# Patient Record
Sex: FEMALE | Race: WHITE | NOT HISPANIC OR LATINO | URBAN - METROPOLITAN AREA
[De-identification: names, ages, dates, MRNs, and addresses within clinical notes are randomized per-mention and may not be internally consistent; named-entity substitution may affect disease eponyms.]

---

## 2020-07-25 ENCOUNTER — TELEPHONE ENCOUNTER (OUTPATIENT)
Dept: URBAN - METROPOLITAN AREA CLINIC 13 | Facility: CLINIC | Age: 35
End: 2020-07-25

## 2020-07-25 RX ORDER — RIFAXIMIN 550 MG/1
TAKE 1 TABLET 3 TIMES DAILY TABLET ORAL
Qty: 42 | Refills: 2 | OUTPATIENT
Start: 2016-02-19 | End: 2016-03-18

## 2020-07-25 RX ORDER — PREDNISONE 10 MG/1
TAKE 1 TABLET DAILY TABLET ORAL
Refills: 0 | OUTPATIENT
End: 2015-12-21

## 2020-07-25 RX ORDER — HYDROCODONE BITARTRATE AND ACETAMINOPHEN 5; 325 MG/1; MG/1
TAKE ONE TABLET BY MOUTH THREE TIMES DAILY AS NEEDED FOR PAIN TABLET ORAL
Qty: 45 | Refills: 0 | OUTPATIENT
Start: 2013-08-26 | End: 2014-01-17

## 2020-07-25 RX ORDER — PREDNISONE 10 MG/1
TABLET ORAL
Qty: 48 | Refills: 0 | OUTPATIENT
Start: 2018-11-14 | End: 2019-08-20

## 2020-07-25 RX ORDER — LIDOCAINE HYDROCHLORIDE 20 MG/ML
SOLUTION ORAL; TOPICAL
Qty: 100 | Refills: 0 | OUTPATIENT
Start: 2019-03-02 | End: 2019-08-20

## 2020-07-25 RX ORDER — DOXYCYCLINE 100 MG/1
TABLET, FILM COATED ORAL
Qty: 14 | Refills: 0 | OUTPATIENT
Start: 2019-08-12 | End: 2019-08-20

## 2020-07-25 RX ORDER — CHLORDIAZEPOXIDE HYDROCHLORIDE AND CLIDINIUM BROMIDE 5; 2.5 MG/1; MG/1
TAKE 1 CAPSULE EVERY 6 HOURS PRN ABDOMINAL PAIN CAPSULE ORAL
Qty: 45 | Refills: 3 | OUTPATIENT
Start: 2016-02-19 | End: 2017-04-14

## 2020-07-25 RX ORDER — AZELASTINE HYDROCHLORIDE 137 UG/1
SPRAY, METERED NASAL
Qty: 30 | Refills: 0 | OUTPATIENT
Start: 2018-12-07 | End: 2019-08-20

## 2020-07-25 RX ORDER — DESOGESTREL/ETHINYL ESTRADIOL AND ETHINYL ESTRADIOL 21-5 (28)
KIT ORAL
Qty: 28 | Refills: 0 | OUTPATIENT
Start: 2013-08-13 | End: 2014-01-17

## 2020-07-25 RX ORDER — LOFEXIDINE HYDROCHLORIDE 0.2 MG/1
TABLET, FILM COATED ORAL
Qty: 108 | Refills: 0 | OUTPATIENT
Start: 2019-02-27 | End: 2019-08-20

## 2020-07-25 RX ORDER — INFLIXIMAB 100 MG/10ML
USE AS DIRECTED INJECTION, POWDER, LYOPHILIZED, FOR SOLUTION INTRAVENOUS
Refills: 0 | OUTPATIENT
End: 2014-01-17

## 2020-07-25 RX ORDER — PREDNISONE 10 MG/1
TAKE 2 TABLETS DAILY TABLET ORAL
Qty: 60 | Refills: 4 | OUTPATIENT
Start: 2013-03-01 | End: 2013-07-02

## 2020-07-25 RX ORDER — MESALAMINE 500 MG/1
TAKE 2 CAPSULES 4 TIMES DAILY CAPSULE ORAL
Qty: 240 | Refills: 11 | OUTPATIENT
Start: 2014-06-02 | End: 2014-11-07

## 2020-07-25 RX ORDER — MESALAMINE 1000 MG/1
INSERT 1 SUPP BEDTIME SUPPOSITORY RECTAL
Qty: 30 | Refills: 0 | OUTPATIENT
Start: 2014-01-28 | End: 2015-06-02

## 2020-07-25 RX ORDER — BUDESONIDE 3 MG/1
TAKE 2 CAPSULE DAILY CAPSULE, COATED PELLETS ORAL
Qty: 60 | Refills: 6 | OUTPATIENT
Start: 2017-04-14 | End: 2018-11-27

## 2020-07-25 RX ORDER — METRONIDAZOLE 500 MG/1
TAKE 1 TABLET 3 TIMES DAILY TABLET, FILM COATED ORAL
Qty: 30 | Refills: 0 | OUTPATIENT
End: 2013-04-19

## 2020-07-25 RX ORDER — ETONOGESTREL 68 MG/1
USE AS DIRECTED IMPLANT SUBCUTANEOUS
Refills: 0 | OUTPATIENT
End: 2017-07-18

## 2020-07-25 RX ORDER — PREDNISONE 10 MG/1
TAKE 1 TABLET DAILY TABLET ORAL
Qty: 30 | Refills: 3 | OUTPATIENT
Start: 2015-07-01 | End: 2015-08-28

## 2020-07-25 RX ORDER — COLESEVELAM HYDROCHLORIDE 625 MG/1
TAKE 1 TABLET TWICE DAILY AS NEEDED FOR DIARRHEA TABLET, FILM COATED ORAL
Qty: 60 | Refills: 2 | OUTPATIENT
Start: 2018-06-04 | End: 2018-06-12

## 2020-07-25 RX ORDER — LEVONORGESTREL AND ETHINYL ESTRADIOL 0.15-0.03
KIT ORAL
Qty: 91 | Refills: 0 | OUTPATIENT
Start: 2018-10-29 | End: 2019-08-20

## 2020-07-25 RX ORDER — PANTOPRAZOLE SODIUM 40 MG/1
7/29**TAKE 1 TABLET BY MOUTH EVERY DAY TABLET, DELAYED RELEASE ORAL
Qty: 30 | Refills: 8 | OUTPATIENT
Start: 2015-12-21 | End: 2019-10-21

## 2020-07-25 RX ORDER — HYDROCODONE BITARTRATE AND ACETAMINOPHEN 5; 325 MG/1; MG/1
TAKE 1 TABLET 3 TIMES DAILY PRN PAIN TABLET ORAL
Qty: 30 | Refills: 0 | OUTPATIENT
Start: 2014-08-22 | End: 2015-08-28

## 2020-07-25 RX ORDER — MERCAPTOPURINE 50 MG/1
1 TABLET PO QD TABLET ORAL
Qty: 30 | Refills: 6 | OUTPATIENT
Start: 2017-08-25 | End: 2017-09-19

## 2020-07-25 RX ORDER — PREDNISONE 20 MG/1
TAKE 1 TABLET DAILY TABLET ORAL
Qty: 30 | Refills: 0 | OUTPATIENT
End: 2016-02-19

## 2020-07-25 RX ORDER — NITROFURANTOIN MONOHYDRATE/MACROCRYSTALLINE 25; 75 MG/1; MG/1
TAKE 1 CAPSULE TWICE DAILY CAPSULE ORAL
Refills: 0 | OUTPATIENT
End: 2015-12-21

## 2020-07-25 RX ORDER — HYDROCODONE BITARTRATE AND ACETAMINOPHEN 5; 325 MG/1; MG/1
TAKE 1 TABLET 3 TIMES DAILY PRN PAIN TABLET ORAL
Qty: 42 | Refills: 0 | OUTPATIENT
Start: 2013-04-19 | End: 2013-08-26

## 2020-07-25 RX ORDER — MERCAPTOPURINE 50 MG/1
TAKE 2 TABLETS BY MOUTH EVERY DAY TABLET ORAL
Qty: 180 | Refills: 4 | OUTPATIENT
Start: 2019-08-26 | End: 2019-10-09

## 2020-07-25 RX ORDER — FLUCONAZOLE 150 MG/1
TABLET ORAL
Qty: 1 | Refills: 0 | OUTPATIENT
Start: 2018-11-14 | End: 2019-08-20

## 2020-07-25 RX ORDER — PROMETHAZINE HYDROCHLORIDE 25 MG/1
INSERT 1 SUPPOSITORY RECTALLY EVERY 8 HOURS AS NEEDED SUPPOSITORY RECTAL
Qty: 45 | Refills: 2 | OUTPATIENT
Start: 2016-02-19 | End: 2017-11-20

## 2020-07-25 RX ORDER — AZITHROMYCIN DIHYDRATE 250 MG/1
TABLET, FILM COATED ORAL
Qty: 6 | Refills: 0 | OUTPATIENT
Start: 2018-10-07 | End: 2019-08-20

## 2020-07-25 RX ORDER — OMEPRAZOLE 20 MG/1
TAKE 1 CAPSULE TWICE DAILY CAPSULE, DELAYED RELEASE ORAL
Qty: 28 | Refills: 1 | OUTPATIENT
Start: 2013-10-29 | End: 2014-01-17

## 2020-07-25 RX ORDER — LIDOCAINE AND PRILOCAINE 25; 25 MG/G; MG/G
APPLY 1 HOUR BEFORE PROCEDURE AS DIRECTED CREAM TOPICAL
Qty: 1 | Refills: 0 | OUTPATIENT
Start: 2013-04-12 | End: 2013-09-23

## 2020-07-25 RX ORDER — MISOPROSTOL 200 UG/1
TABLET ORAL
Qty: 4 | Refills: 0 | OUTPATIENT
Start: 2018-08-27 | End: 2019-08-20

## 2020-07-25 RX ORDER — INFLIXIMAB 100 MG/10ML
USE AS DIRECTED INJECTION, POWDER, LYOPHILIZED, FOR SOLUTION INTRAVENOUS
Qty: 30 | Refills: 0 | OUTPATIENT
Start: 2015-01-06 | End: 2017-11-20

## 2020-07-25 RX ORDER — POLYETHYLENE GLYCOL 3350, SODIUM CHLORIDE, SODIUM BICARBONATE AND POTASSIUM CHLORIDE WITH LEMON FLAVOR 420; 11.2; 5.72; 1.48 G/4L; G/4L; G/4L; G/4L
TAKE AS DIRECTED POWDER, FOR SOLUTION ORAL
Qty: 1 | Refills: 0 | OUTPATIENT
Start: 2015-12-21 | End: 2016-01-25

## 2020-07-25 RX ORDER — BISMUTH SUBCITRATE POTASSIUM, METRONIDAZOLE, TETRACYCLINE HYDROCHLORIDE 140; 125; 125 MG/1; MG/1; MG/1
TAKE 3 CAPSULE EVERY 6 HOURS CAPSULE ORAL
Qty: 120 | Refills: 0 | OUTPATIENT
Start: 2013-10-29 | End: 2014-01-17

## 2020-07-25 RX ORDER — COLESEVELAM HYDROCHLORIDE 625 MG/1
TAKE 3 TABLET TWICE DAILY TABLET, FILM COATED ORAL
Qty: 180 | Refills: 11 | OUTPATIENT
Start: 2014-05-23 | End: 2014-11-07

## 2020-07-25 RX ORDER — POLYETHYLENE GLYCOL 3350, SODIUM SULFATE, SODIUM CHLORIDE, POTASSIUM CHLORIDE, ASCORBIC ACID, SODIUM ASCORBATE 7.5-2.691G
TAKE 32 OZ AS DIRECTED 5:00PM THE EVENING BEFORE AND 6HR PRIOR TO PROCEDURE KIT ORAL
Qty: 1 | Refills: 0 | OUTPATIENT
Start: 2014-08-19 | End: 2014-08-22

## 2020-07-25 RX ORDER — HYDROCODONE BITARTRATE AND ACETAMINOPHEN 5; 325 MG/1; MG/1
TAKE 1 TABLET 3 TIMES DAILY PRN PAIN TABLET ORAL
Qty: 40 | Refills: 0 | OUTPATIENT
Start: 2015-09-17 | End: 2018-11-27

## 2020-07-25 RX ORDER — AZATHIOPRINE 50 1/1
TAKE 1.5 TABLET DAILY TABLET ORAL
Qty: 45 | Refills: 11 | OUTPATIENT
Start: 2013-03-01 | End: 2014-01-17

## 2020-07-25 RX ORDER — CIPROFLOXACIN HYDROCHLORIDE 500 MG/1
TAKE 1 TABLET TWICE DAILY TABLET, FILM COATED ORAL
Qty: 20 | Refills: 0 | OUTPATIENT
End: 2013-10-08

## 2020-07-25 RX ORDER — CEPHALEXIN 500 MG/1
CAPSULE ORAL
Qty: 30 | Refills: 0 | OUTPATIENT
Start: 2018-10-18 | End: 2019-08-20

## 2020-07-25 RX ORDER — DIPHENOXYLATE HYDROCHLORIDE AND ATROPINE SULFATE 2.5; .025 MG/1; MG/1
TAKE 1-2 TABLETS BID PRN DAIRRHEA TABLET ORAL
Qty: 50 | Refills: 1 | OUTPATIENT
Start: 2017-05-25 | End: 2017-09-19

## 2020-07-25 RX ORDER — MERCAPTOPURINE 50 MG/1
TAKE 2 TABLETS BY MOUTH EVERY DAY TABLET ORAL
Qty: 60 | Refills: 11 | OUTPATIENT
Start: 2017-08-25 | End: 2019-08-26

## 2020-07-26 ENCOUNTER — TELEPHONE ENCOUNTER (OUTPATIENT)
Dept: URBAN - METROPOLITAN AREA CLINIC 13 | Facility: CLINIC | Age: 35
End: 2020-07-26

## 2020-07-26 RX ORDER — METRONIDAZOLE 500 MG/1
TABLET ORAL
Qty: 28 | Refills: 0 | Status: ACTIVE | COMMUNITY
Start: 2013-02-13

## 2020-07-26 RX ORDER — CEPHALEXIN 500 MG/1
CAPSULE ORAL
Qty: 30 | Refills: 0 | Status: ACTIVE | COMMUNITY
Start: 2018-10-18

## 2020-07-26 RX ORDER — CODEINE PHOSPHATE AND GUAIFENESIN 10; 100 MG/5ML; MG/5ML
SOLUTION ORAL
Qty: 120 | Refills: 0 | Status: ACTIVE | COMMUNITY
Start: 2019-03-16

## 2020-07-26 RX ORDER — MERCAPTOPURINE 50 MG/1
TAKE 2 TABLETS BY MOUTH EVERY DAY TABLET ORAL
Qty: 180 | Refills: 5 | Status: ACTIVE | COMMUNITY
Start: 2019-10-09

## 2020-07-26 RX ORDER — ACETAMINOPHEN AND CODEINE PHOSPHATE 300; 30 MG/1; MG/1
TABLET ORAL
Qty: 30 | Refills: 0 | Status: ACTIVE | COMMUNITY
Start: 2015-06-26

## 2020-07-26 RX ORDER — OXYCODONE AND ACETAMINOPHEN 5; 325 MG/1; MG/1
TABLET ORAL
Qty: 30 | Refills: 0 | Status: ACTIVE | COMMUNITY
Start: 2013-02-13

## 2020-07-26 RX ORDER — PREDNISONE 10 MG/1
TABLET ORAL
Qty: 48 | Refills: 0 | Status: ACTIVE | COMMUNITY
Start: 2018-11-14

## 2020-07-26 RX ORDER — AZITHROMYCIN DIHYDRATE 250 MG/1
TABLET, FILM COATED ORAL
Qty: 6 | Refills: 0 | Status: ACTIVE | COMMUNITY
Start: 2018-10-07

## 2020-07-26 RX ORDER — AZITHROMYCIN DIHYDRATE 250 MG/1
TAKE 2 TABLETS BY MOUTH ON DAY ONE AND 1 TABLET BY MOUTH ON DAYS 2-5 TABLET, FILM COATED ORAL
Qty: 6 | Refills: 0 | Status: ACTIVE | COMMUNITY
Start: 2018-03-10

## 2020-07-26 RX ORDER — VITAM B12 100 MCG
TAKE 1 TABLET DAILY AS DIRECTED TAB ORAL
Refills: 0 | Status: ACTIVE | COMMUNITY

## 2020-07-26 RX ORDER — LIDOCAINE HYDROCHLORIDE 20 MG/ML
SOLUTION ORAL; TOPICAL
Qty: 100 | Refills: 0 | Status: ACTIVE | COMMUNITY
Start: 2019-03-02

## 2020-07-26 RX ORDER — TRIAMCINOLONE ACETONIDE 1 MG/G
APPLY AND GENTLY MASSAGE INTO AFFECTED AREA(S) TWICE DAILY OINTMENT TOPICAL
Refills: 0 | Status: ACTIVE | COMMUNITY
Start: 2019-05-13

## 2020-07-26 RX ORDER — PANTOPRAZOLE SODIUM 40 MG/1
TABLET, DELAYED RELEASE ORAL
Qty: 60 | Refills: 0 | Status: ACTIVE | COMMUNITY
Start: 2015-06-09

## 2020-07-26 RX ORDER — HYDROCODONE BITARTRATE AND ACETAMINOPHEN 10; 325 MG/1; MG/1
TABLET ORAL
Qty: 15 | Refills: 0 | Status: ACTIVE | COMMUNITY
Start: 2019-06-18

## 2020-07-26 RX ORDER — DIPHENOXYLATE HYDROCHLORIDE AND ATROPINE SULFATE 2.5; .025 MG/1; MG/1
TABLET ORAL
Qty: 30 | Refills: 0 | Status: ACTIVE | COMMUNITY
Start: 2013-12-20

## 2020-07-26 RX ORDER — CITALOPRAM 40 MG/1
TABLET ORAL
Qty: 30 | Refills: 0 | Status: ACTIVE | COMMUNITY
Start: 2019-03-03

## 2020-07-26 RX ORDER — OSELTAMIVIR PHOSPHATE 75 MG/1
TAKE 1 CAPSULE (ORAL) 2 TIMES PER DAY FOR 5 DAYS CAPSULE ORAL
Qty: 10 | Refills: 0 | Status: ACTIVE | COMMUNITY
Start: 2018-01-27

## 2020-07-26 RX ORDER — CLONIDINE HYDROCHLORIDE 0.1 MG/1
TAKE 1 TABLET AT BEDTIME TABLET ORAL
Refills: 0 | Status: ACTIVE | COMMUNITY
Start: 2019-05-22

## 2020-07-26 RX ORDER — DOXYCYCLINE HYCLATE 100 MG/1
TABLET ORAL
Qty: 20 | Refills: 0 | Status: ACTIVE | COMMUNITY
Start: 2019-03-16

## 2020-07-26 RX ORDER — ACETAMINOPHEN AND CODEINE PHOSPHATE 300; 30 MG/1; MG/1
TABLET ORAL
Qty: 18 | Refills: 0 | Status: ACTIVE | COMMUNITY
Start: 2013-05-20

## 2020-07-26 RX ORDER — MISOPROSTOL 200 UG/1
TABLET ORAL
Qty: 4 | Refills: 0 | Status: ACTIVE | COMMUNITY
Start: 2018-08-27

## 2020-07-26 RX ORDER — CODEINE POLISTIREX AND CHLORPHENIRAMINE POLISTIREX 14.7; 2.8 MG/5ML; MG/5ML
TAKE 2 TEASPOONS EVERY 12 HOURS FOR 7 DAYS SUSPENSION, EXTENDED RELEASE ORAL
Qty: 140 | Refills: 0 | Status: ACTIVE | COMMUNITY
Start: 2018-03-10

## 2020-07-26 RX ORDER — PHENTERMINE HYDROCHLORIDE 37.5 MG/1
TAKE 1 CAPSULE BY MOUTH EVERY DAY IN THE MORNING CAPSULE ORAL
Qty: 30 | Refills: 0 | Status: ACTIVE | COMMUNITY
Start: 2019-01-22

## 2020-07-26 RX ORDER — AZELASTINE HYDROCHLORIDE 137 UG/1
SPRAY, METERED NASAL
Qty: 30 | Refills: 0 | Status: ACTIVE | COMMUNITY
Start: 2018-12-07

## 2020-07-26 RX ORDER — HYOSCYAMINE SULFATE 0.12 MG/1
PLACE 1-2 TABLET EVERY 6 HOURS PRN ABD PAIN TABLET, ORALLY DISINTEGRATING ORAL
Qty: 45 | Refills: 4 | Status: ACTIVE | COMMUNITY
Start: 2018-11-27

## 2020-07-26 RX ORDER — LOFEXIDINE HYDROCHLORIDE 0.2 MG/1
TABLET, FILM COATED ORAL
Qty: 108 | Refills: 0 | Status: ACTIVE | COMMUNITY
Start: 2019-02-27

## 2020-07-26 RX ORDER — DIPHENOXYLATE HYDROCHLORIDE AND ATROPINE SULFATE 2.5; .025 MG/1; MG/1
TAKE ONE TO TWO TABLETS BY MOUTH EVERY 6 HOURS AS NEEDED FOR DIARRHEA TABLET ORAL
Qty: 45 | Refills: 0 | Status: ACTIVE | COMMUNITY
Start: 2017-08-25

## 2020-07-26 RX ORDER — AMOXICILLIN AND CLAVULANATE POTASSIUM 875; 125 MG/1; MG/1
TABLET, FILM COATED ORAL
Qty: 20 | Refills: 0 | Status: ACTIVE | COMMUNITY
Start: 2018-06-20

## 2020-07-26 RX ORDER — FLUCONAZOLE 150 MG/1
TABLET ORAL
Qty: 1 | Refills: 0 | Status: ACTIVE | COMMUNITY
Start: 2013-08-13

## 2020-07-26 RX ORDER — ACETAMINOPHEN 325 MG
TAKE 1 CAPSULE DAILY TABLET ORAL
Refills: 0 | Status: ACTIVE | COMMUNITY

## 2020-07-26 RX ORDER — COLESTIPOL HYDROCHLORIDE 1 G/1
TAKE 2 TABLETS TWICE DAILY TABLET ORAL
Qty: 120 | Refills: 11 | Status: ACTIVE | COMMUNITY
Start: 2018-06-12

## 2020-07-26 RX ORDER — CITALOPRAM 20 MG/1
TAKE ONE TABLET BY MOUTH DAILY TABLET ORAL
Qty: 30 | Refills: 6 | Status: ACTIVE | COMMUNITY
Start: 2014-08-25

## 2020-07-26 RX ORDER — LEVONORGESTREL AND ETHINYL ESTRADIOL 0.15-0.03
KIT ORAL
Qty: 91 | Refills: 0 | Status: ACTIVE | COMMUNITY
Start: 2018-10-29

## 2020-07-26 RX ORDER — NYSTATIN AND TRIAMCINOLONE ACETONIDE 100000; 1 MG/G; MG/G
CREAM TOPICAL
Qty: 30 | Refills: 0 | Status: ACTIVE | COMMUNITY
Start: 2013-08-13

## 2020-07-26 RX ORDER — LEVONORGESTREL AND ETHINYL ESTRADIOL 0.15-0.03
TAKE 1 TABLET BY MOUTH EVERY DAY SKIP PLACEBO PILLS KIT ORAL
Qty: 91 | Refills: 0 | Status: ACTIVE | COMMUNITY
Start: 2017-08-17

## 2020-07-26 RX ORDER — HYDROCODONE BITARTRATE AND ACETAMINOPHEN 10; 325 MG/1; MG/1
TABLET ORAL
Qty: 20 | Refills: 0 | Status: ACTIVE | COMMUNITY
Start: 2015-02-27

## 2020-07-26 RX ORDER — PREDNISONE 10 MG/1
TABLET ORAL
Qty: 54 | Refills: 0 | Status: ACTIVE | COMMUNITY
Start: 2013-02-13

## 2020-07-26 RX ORDER — TRAMADOL HYDROCHLORIDE 50 MG/1
TAKE 1 TABLET BY MOUTH EVERY 6 HOURS AS NEEDED FOR PAIN TABLET ORAL
Qty: 45 | Refills: 0 | Status: ACTIVE | COMMUNITY
Start: 2016-01-25

## 2020-07-26 RX ORDER — CODEINE PHOSPHATE AND GUAIFENESIN 10; 100 MG/5ML; MG/5ML
TAKE 5 TO 10ML BY MOUTH 4 TIMES A DAY AS NEEDED COUGH SOLUTION ORAL
Qty: 120 | Refills: 0 | Status: ACTIVE | COMMUNITY
Start: 2018-01-27

## 2020-07-26 RX ORDER — FLUCONAZOLE 150 MG/1
TAKE 1 TABLET BY MOUTH EVERY DAY TABLET ORAL
Qty: 2 | Refills: 0 | Status: ACTIVE | COMMUNITY
Start: 2018-06-26

## 2020-07-26 RX ORDER — ACETAMINOPHEN AND CODEINE PHOSPHATE 300; 30 MG/1; MG/1
TABLET ORAL
Qty: 20 | Refills: 0 | Status: ACTIVE | COMMUNITY
Start: 2019-03-20

## 2020-07-26 RX ORDER — PROMETHAZINE HYDROCHLORIDE 12.5 MG/1
TABLET ORAL
Qty: 30 | Refills: 0 | Status: ACTIVE | COMMUNITY
Start: 2015-06-09

## 2020-07-26 RX ORDER — FLUCONAZOLE 150 MG/1
TABLET ORAL
Qty: 1 | Refills: 0 | Status: ACTIVE | COMMUNITY
Start: 2018-11-14

## 2020-07-26 RX ORDER — BENZONATATE 200 MG/1
TAKE 1 CAPSULE BY MOUTH THREE TIMES A DAY CAPSULE ORAL
Qty: 30 | Refills: 0 | Status: ACTIVE | COMMUNITY
Start: 2018-06-20

## 2020-07-26 RX ORDER — HYDROCODONE BITARTRATE AND ACETAMINOPHEN 10; 325 MG/1; MG/1
TABLET ORAL
Qty: 30 | Refills: 0 | Status: ACTIVE | COMMUNITY
Start: 2015-06-09

## 2020-07-26 RX ORDER — HYDROCODONE BITARTRATE AND ACETAMINOPHEN 10; 325 MG/1; MG/1
TABLET ORAL
Qty: 60 | Refills: 0 | Status: ACTIVE | COMMUNITY
Start: 2013-12-10

## 2020-07-26 RX ORDER — PREDNISOLONE ACETATE 10 MG/ML
INSTILL 1 DROP IN THE LEFT EYE EVERY HOUR WHILE AWAKE SUSPENSION/ DROPS OPHTHALMIC
Refills: 0 | Status: ACTIVE | COMMUNITY
Start: 2019-03-12

## 2020-07-26 RX ORDER — PANTOPRAZOLE SODIUM 40 MG/1
7/29**TAKE 1 TABLET BY MOUTH EVERY DAY TABLET, DELAYED RELEASE ORAL
Qty: 90 | Refills: 3 | Status: ACTIVE | COMMUNITY
Start: 2019-10-21

## 2020-07-26 RX ORDER — AMOXICILLIN AND CLAVULANATE POTASSIUM 875; 125 MG/1; MG/1
TAKE 1 TABLET (ORAL) EVERY 12 HOURS FOR 7 DAYS TABLET, FILM COATED ORAL
Qty: 14 | Refills: 0 | Status: ACTIVE | COMMUNITY
Start: 2018-03-10

## 2020-07-26 RX ORDER — FLUCONAZOLE 150 MG/1
TAKE 1 TABLET (150 MG TOTAL) BY MOUTH AS A SINGLE DOSE TABLET ORAL
Qty: 1 | Refills: 0 | Status: ACTIVE | COMMUNITY
Start: 2018-03-18

## 2020-12-21 ENCOUNTER — ERX REFILL RESPONSE (OUTPATIENT)
Age: 35
End: 2020-12-21

## 2020-12-21 RX ORDER — PANTOPRAZOLE SODIUM 40 MG/1
7/29**TAKE 1 TABLET BY MOUTH EVERY DAY TABLET, DELAYED RELEASE ORAL
Qty: 90 | Refills: 3

## 2020-12-22 ENCOUNTER — ERX REFILL RESPONSE (OUTPATIENT)
Age: 35
End: 2020-12-22

## 2020-12-22 RX ORDER — MERCAPTOPURINE 50 MG/1
TAKE 2 TABLETS BY MOUTH EVERY DAY TABLET ORAL
Qty: 180 | Refills: 4

## 2021-02-17 ENCOUNTER — ERX REFILL RESPONSE (OUTPATIENT)
Age: 36
End: 2021-02-17

## 2021-02-17 RX ORDER — PROMETHAZINE HYDROCHLORIDE 12.5 MG/1
TAKE 1 TO 2 TABLETS BY MOUTH EVERY 6 HOURS AS NEEDED TABLET ORAL
Qty: 40 | Refills: 3

## 2021-12-07 ENCOUNTER — WEB ENCOUNTER (OUTPATIENT)
Dept: URBAN - METROPOLITAN AREA CLINIC 113 | Facility: CLINIC | Age: 36
End: 2021-12-07

## 2021-12-20 ENCOUNTER — ERX REFILL RESPONSE (OUTPATIENT)
Dept: URBAN - METROPOLITAN AREA CLINIC 113 | Facility: CLINIC | Age: 36
End: 2021-12-20

## 2021-12-20 RX ORDER — PANTOPRAZOLE SODIUM 40 MG/1
7/29**TAKE 1 TABLET BY MOUTH EVERY DAY 90 TABLET, DELAYED RELEASE ORAL
Qty: 90 TABLET | Refills: 4 | OUTPATIENT

## 2021-12-20 RX ORDER — PANTOPRAZOLE SODIUM 40 MG/1
7/29**TAKE 1 TABLET BY MOUTH EVERY DAY TABLET, DELAYED RELEASE ORAL
Qty: 90 | Refills: 3 | OUTPATIENT

## 2022-01-24 ENCOUNTER — ERX REFILL RESPONSE (OUTPATIENT)
Dept: URBAN - METROPOLITAN AREA CLINIC 113 | Facility: CLINIC | Age: 37
End: 2022-01-24

## 2022-01-24 RX ORDER — MERCAPTOPURINE 50 MG/1
TAKE 2 TABLETS BY MOUTH EVERY DAY TABLET ORAL
Qty: 180 TABLET | Refills: 5 | OUTPATIENT

## 2022-01-24 RX ORDER — MERCAPTOPURINE 50 MG/1
TAKE 2 TABLETS BY MOUTH EVERY DAY TABLET ORAL
Qty: 180 | Refills: 4 | OUTPATIENT

## 2022-05-13 ENCOUNTER — DASHBOARD ENCOUNTERS (OUTPATIENT)
Age: 37
End: 2022-05-13

## 2022-05-13 ENCOUNTER — OFFICE VISIT (OUTPATIENT)
Dept: URBAN - METROPOLITAN AREA CLINIC 113 | Facility: CLINIC | Age: 37
End: 2022-05-13
Payer: COMMERCIAL

## 2022-05-13 VITALS
DIASTOLIC BLOOD PRESSURE: 98 MMHG | WEIGHT: 214 LBS | SYSTOLIC BLOOD PRESSURE: 136 MMHG | HEIGHT: 64 IN | HEART RATE: 93 BPM | BODY MASS INDEX: 36.54 KG/M2 | TEMPERATURE: 97.7 F

## 2022-05-13 DIAGNOSIS — K62.5 BRIGHT RED BLOOD PER RECTUM: ICD-10-CM

## 2022-05-13 DIAGNOSIS — R19.7 DIARRHEA: ICD-10-CM

## 2022-05-13 DIAGNOSIS — R10.84 GENERALIZED ABDOMINAL PAIN: ICD-10-CM

## 2022-05-13 DIAGNOSIS — R11.0 NAUSEA: ICD-10-CM

## 2022-05-13 DIAGNOSIS — K50.00 CROHN'S DISEASE INVOLVING TERMINAL ILEUM: ICD-10-CM

## 2022-05-13 PROCEDURE — 99214 OFFICE O/P EST MOD 30 MIN: CPT | Performed by: INTERNAL MEDICINE

## 2022-05-13 RX ORDER — HYOSCYAMINE SULFATE 0.12 MG/1
PLACE 1-2 TABLET EVERY 6 HOURS PRN ABD PAIN TABLET, ORALLY DISINTEGRATING ORAL
Qty: 45 | Refills: 4 | Status: ON HOLD | COMMUNITY
Start: 2018-11-27

## 2022-05-13 RX ORDER — TRIAMCINOLONE ACETONIDE 1 MG/G
APPLY AND GENTLY MASSAGE INTO AFFECTED AREA(S) TWICE DAILY OINTMENT TOPICAL
Refills: 0 | Status: ON HOLD | COMMUNITY
Start: 2019-05-13

## 2022-05-13 RX ORDER — CITALOPRAM 20 MG/1
TAKE ONE TABLET BY MOUTH DAILY TABLET ORAL
Qty: 30 | Refills: 6 | Status: ON HOLD | COMMUNITY
Start: 2014-08-25

## 2022-05-13 RX ORDER — ACETAMINOPHEN AND CODEINE PHOSPHATE 300; 30 MG/1; MG/1
TABLET ORAL
Qty: 18 | Refills: 0 | Status: ON HOLD | COMMUNITY
Start: 2013-05-20

## 2022-05-13 RX ORDER — PHENTERMINE HYDROCHLORIDE 37.5 MG/1
TAKE 1 CAPSULE BY MOUTH EVERY DAY IN THE MORNING CAPSULE ORAL
Qty: 30 | Refills: 0 | Status: ON HOLD | COMMUNITY
Start: 2019-01-22

## 2022-05-13 RX ORDER — OSELTAMIVIR PHOSPHATE 75 MG/1
TAKE 1 CAPSULE (ORAL) 2 TIMES PER DAY FOR 5 DAYS CAPSULE ORAL
Qty: 10 | Refills: 0 | Status: ON HOLD | COMMUNITY
Start: 2018-01-27

## 2022-05-13 RX ORDER — METRONIDAZOLE 500 MG/1
TABLET ORAL
Qty: 28 | Refills: 0 | Status: ON HOLD | COMMUNITY
Start: 2013-02-13

## 2022-05-13 RX ORDER — FLUCONAZOLE 150 MG/1
TABLET ORAL
Qty: 1 | Refills: 0 | Status: ON HOLD | COMMUNITY
Start: 2013-08-13

## 2022-05-13 RX ORDER — PANTOPRAZOLE SODIUM 40 MG/1
7/29**TAKE 1 TABLET BY MOUTH EVERY DAY 90 TABLET, DELAYED RELEASE ORAL
Qty: 90 TABLET | Refills: 4 | Status: ACTIVE | COMMUNITY

## 2022-05-13 RX ORDER — SODIUM, POTASSIUM,MAG SULFATES 17.5-3.13G
354 ML SOLUTION, RECONSTITUTED, ORAL ORAL ONCE
Qty: 354 MILLILITER | Refills: 0 | OUTPATIENT
Start: 2022-05-13 | End: 2022-05-14

## 2022-05-13 RX ORDER — TRAMADOL HYDROCHLORIDE 50 MG/1
TAKE 1 TABLET BY MOUTH EVERY 6 HOURS AS NEEDED FOR PAIN TABLET ORAL
Qty: 45 | Refills: 0 | Status: ON HOLD | COMMUNITY
Start: 2016-01-25

## 2022-05-13 RX ORDER — DOXYCYCLINE HYCLATE 100 MG/1
TABLET ORAL
Qty: 20 | Refills: 0 | Status: ON HOLD | COMMUNITY
Start: 2019-03-16

## 2022-05-13 RX ORDER — LEVONORGESTREL AND ETHINYL ESTRADIOL 0.15-0.03
TAKE 1 TABLET BY MOUTH EVERY DAY SKIP PLACEBO PILLS KIT ORAL
Qty: 91 | Refills: 0 | Status: ON HOLD | COMMUNITY
Start: 2017-08-17

## 2022-05-13 RX ORDER — PROMETHAZINE HYDROCHLORIDE 12.5 MG/1
1 TABLET AS NEEDED FOR NAUSEA TABLET ORAL
Qty: 40 TABLET | Refills: 1 | OUTPATIENT
Start: 2022-05-13 | End: 2022-06-02

## 2022-05-13 RX ORDER — VITAM B12 100 MCG
TAKE 1 TABLET DAILY AS DIRECTED TAB ORAL
Refills: 0 | Status: ON HOLD | COMMUNITY

## 2022-05-13 RX ORDER — LOFEXIDINE HYDROCHLORIDE 0.2 MG/1
TABLET, FILM COATED ORAL
Qty: 108 | Refills: 0 | Status: ON HOLD | COMMUNITY
Start: 2019-02-27

## 2022-05-13 RX ORDER — OXYCODONE AND ACETAMINOPHEN 5; 325 MG/1; MG/1
TABLET ORAL
Qty: 30 | Refills: 0 | Status: ON HOLD | COMMUNITY
Start: 2013-02-13

## 2022-05-13 RX ORDER — LIDOCAINE HYDROCHLORIDE 20 MG/ML
SOLUTION ORAL; TOPICAL
Qty: 100 | Refills: 0 | Status: ON HOLD | COMMUNITY
Start: 2019-03-02

## 2022-05-13 RX ORDER — MERCAPTOPURINE 50 MG/1
2 TABLETS TABLET ORAL DAILY
Status: ACTIVE | COMMUNITY

## 2022-05-13 RX ORDER — BENZONATATE 200 MG/1
TAKE 1 CAPSULE BY MOUTH THREE TIMES A DAY CAPSULE ORAL
Qty: 30 | Refills: 0 | Status: ON HOLD | COMMUNITY
Start: 2018-06-20

## 2022-05-13 RX ORDER — HYDROCODONE BITARTRATE AND ACETAMINOPHEN 10; 325 MG/1; MG/1
TABLET ORAL
Qty: 60 | Refills: 0 | Status: ON HOLD | COMMUNITY
Start: 2013-12-10

## 2022-05-13 RX ORDER — AZITHROMYCIN DIHYDRATE 250 MG/1
TAKE 2 TABLETS BY MOUTH ON DAY ONE AND 1 TABLET BY MOUTH ON DAYS 2-5 TABLET, FILM COATED ORAL
Qty: 6 | Refills: 0 | Status: ON HOLD | COMMUNITY
Start: 2018-03-10

## 2022-05-13 RX ORDER — NYSTATIN AND TRIAMCINOLONE ACETONIDE 100000; 1 MG/G; MG/G
CREAM TOPICAL
Qty: 30 | Refills: 0 | Status: ON HOLD | COMMUNITY
Start: 2013-08-13

## 2022-05-13 RX ORDER — DIPHENOXYLATE HYDROCHLORIDE AND ATROPINE SULFATE 2.5; .025 MG/1; MG/1
TABLET ORAL
Qty: 30 | Refills: 0 | Status: ON HOLD | COMMUNITY
Start: 2013-12-20

## 2022-05-13 RX ORDER — CODEINE POLISTIREX AND CHLORPHENIRAMINE POLISTIREX 14.7; 2.8 MG/5ML; MG/5ML
TAKE 2 TEASPOONS EVERY 12 HOURS FOR 7 DAYS SUSPENSION, EXTENDED RELEASE ORAL
Qty: 140 | Refills: 0 | Status: ON HOLD | COMMUNITY
Start: 2018-03-10

## 2022-05-13 RX ORDER — AZELASTINE HYDROCHLORIDE 137 UG/1
SPRAY, METERED NASAL
Qty: 30 | Refills: 0 | Status: ON HOLD | COMMUNITY
Start: 2018-12-07

## 2022-05-13 RX ORDER — CODEINE PHOSPHATE AND GUAIFENESIN 10; 100 MG/5ML; MG/5ML
TAKE 5 TO 10ML BY MOUTH 4 TIMES A DAY AS NEEDED COUGH SOLUTION ORAL
Qty: 120 | Refills: 0 | Status: ON HOLD | COMMUNITY
Start: 2018-01-27

## 2022-05-13 RX ORDER — MISOPROSTOL 200 UG/1
TABLET ORAL
Qty: 4 | Refills: 0 | Status: ON HOLD | COMMUNITY
Start: 2018-08-27

## 2022-05-13 RX ORDER — PREDNISOLONE ACETATE 10 MG/ML
INSTILL 1 DROP IN THE LEFT EYE EVERY HOUR WHILE AWAKE SUSPENSION/ DROPS OPHTHALMIC
Refills: 0 | Status: ON HOLD | COMMUNITY
Start: 2019-03-12

## 2022-05-13 RX ORDER — CLONIDINE HYDROCHLORIDE 0.1 MG/1
TAKE 1 TABLET AT BEDTIME TABLET ORAL
Refills: 0 | Status: ON HOLD | COMMUNITY
Start: 2019-05-22

## 2022-05-13 RX ORDER — PROMETHAZINE HYDROCHLORIDE 12.5 MG/1
TAKE 1 TO 2 TABLETS BY MOUTH EVERY 6 HOURS AS NEEDED TABLET ORAL
Qty: 40 | Refills: 3 | Status: ON HOLD | COMMUNITY

## 2022-05-13 RX ORDER — AMOXICILLIN AND CLAVULANATE POTASSIUM 875; 125 MG/1; MG/1
TAKE 1 TABLET (ORAL) EVERY 12 HOURS FOR 7 DAYS TABLET, FILM COATED ORAL
Qty: 14 | Refills: 0 | Status: ON HOLD | COMMUNITY
Start: 2018-03-10

## 2022-05-13 RX ORDER — CITALOPRAM 40 MG/1
TABLET ORAL
Qty: 30 | Refills: 0 | Status: ON HOLD | COMMUNITY
Start: 2019-03-03

## 2022-05-13 RX ORDER — ACETAMINOPHEN 325 MG
TAKE 1 CAPSULE DAILY TABLET ORAL
Refills: 0 | Status: ON HOLD | COMMUNITY

## 2022-05-13 RX ORDER — COLESTIPOL HYDROCHLORIDE 1 G/1
TAKE 2 TABLETS TWICE DAILY TABLET ORAL
Qty: 120 | Refills: 11 | Status: ON HOLD | COMMUNITY
Start: 2018-06-12

## 2022-05-13 RX ORDER — MERCAPTOPURINE 50 MG/1
TAKE 2 TABLETS BY MOUTH EVERY DAY TABLET ORAL
Qty: 180 TABLET | Refills: 5 | Status: ON HOLD | COMMUNITY

## 2022-05-13 RX ORDER — PREDNISONE 10 MG/1
TABLET ORAL
Qty: 48 | Refills: 0 | Status: ON HOLD | COMMUNITY
Start: 2018-11-14

## 2022-05-13 RX ORDER — CITALOPRAM 40 MG/1
0.5 TABLET TABLET, FILM COATED ORAL ONCE A DAY
Status: ACTIVE | COMMUNITY

## 2022-05-13 RX ORDER — CEPHALEXIN 500 MG/1
CAPSULE ORAL
Qty: 30 | Refills: 0 | Status: ON HOLD | COMMUNITY
Start: 2018-10-18

## 2022-05-13 RX ORDER — PREDNISONE 10 MG/1
TABLET ORAL
Qty: 54 | Refills: 0 | Status: ON HOLD | COMMUNITY
Start: 2013-02-13

## 2022-05-13 NOTE — HPI-OTHER HISTORIES
CBC, CMP, amylase, lipase, CRP and ESR were unremarkable on 2/27/19. CT of the abdomen and pelvis with contrast on 12/7/18 demonstrated mild colitis, prior partial colectomy, appendectomy, cholecystectomy, hepatic steatosis, IUD in place, a 5 cm left ovarian cyst, and mild cul-de-sac free fluid. Labs (5/25/17): H/H 13.0/43.0, MCV 75.8, Plt 329, WBC 8.93. CMP unremarkable, lipase 13. CTAP w/ contrast (5/25/17) for diarrhea and epigastric pain: There is no mucosal thickening, inflammation, or free fluid. There is no evidence of active Crohn's disease, abscess, or fistula. There is moderate fluid in the right and transverse colon which could reflect gastroenteritis. No mass or adenopathy. 3.1cm cyst left ovary. Otherwise negative study. Colonoscopy (2/13/17): Normal colonic mucosa. Normal and patent end-to-end ileo-colonic anastamosis. Terminal ileum was normal. Moderate redundant colon with looping encountered. Otherwise normal to terminal ileum. Path unremarkable.
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
## 2022-06-06 PROBLEM — 56689002 CROHN'S DISEASE OF SMALL INTESTINE: Status: ACTIVE | Noted: 2022-05-13

## 2022-07-01 ENCOUNTER — WEB ENCOUNTER (OUTPATIENT)
Dept: URBAN - METROPOLITAN AREA SURGERY CENTER 25 | Facility: SURGERY CENTER | Age: 37
End: 2022-07-01

## 2022-07-01 ENCOUNTER — TELEPHONE ENCOUNTER (OUTPATIENT)
Dept: URBAN - METROPOLITAN AREA CLINIC 113 | Facility: CLINIC | Age: 37
End: 2022-07-01

## 2022-07-01 ENCOUNTER — CLAIMS CREATED FROM THE CLAIM WINDOW (OUTPATIENT)
Dept: URBAN - METROPOLITAN AREA CLINIC 4 | Facility: CLINIC | Age: 37
End: 2022-07-01
Payer: COMMERCIAL

## 2022-07-01 ENCOUNTER — OFFICE VISIT (OUTPATIENT)
Dept: URBAN - METROPOLITAN AREA SURGERY CENTER 25 | Facility: SURGERY CENTER | Age: 37
End: 2022-07-01
Payer: COMMERCIAL

## 2022-07-01 DIAGNOSIS — K63.89 OTHER SPECIFIED DISEASES OF INTESTINE: ICD-10-CM

## 2022-07-01 DIAGNOSIS — K52.9 CHRONIC DIARRHEA: ICD-10-CM

## 2022-07-01 DIAGNOSIS — K50.819 CROHN'S DISEASE OF SMALL AND LARGE INTESTINES WITH COMPLICATION: ICD-10-CM

## 2022-07-01 PROCEDURE — 88305 TISSUE EXAM BY PATHOLOGIST: CPT | Performed by: PATHOLOGY

## 2022-07-01 PROCEDURE — 88342 IMHCHEM/IMCYTCHM 1ST ANTB: CPT | Performed by: PATHOLOGY

## 2022-07-01 PROCEDURE — 88313 SPECIAL STAINS GROUP 2: CPT | Performed by: PATHOLOGY

## 2022-07-01 PROCEDURE — G8907 PT DOC NO EVENTS ON DISCHARG: HCPCS | Performed by: INTERNAL MEDICINE

## 2022-07-01 PROCEDURE — 45380 COLONOSCOPY AND BIOPSY: CPT | Performed by: INTERNAL MEDICINE

## 2022-07-01 RX ORDER — CITALOPRAM 40 MG/1
TABLET ORAL
Qty: 30 | Refills: 0 | Status: ON HOLD | COMMUNITY
Start: 2019-03-03

## 2022-07-01 RX ORDER — TRAMADOL HYDROCHLORIDE 50 MG/1
TAKE 1 TABLET BY MOUTH EVERY 6 HOURS AS NEEDED FOR PAIN TABLET ORAL
Qty: 45 | Refills: 0 | Status: ON HOLD | COMMUNITY
Start: 2016-01-25

## 2022-07-01 RX ORDER — PHENTERMINE HYDROCHLORIDE 37.5 MG/1
TAKE 1 CAPSULE BY MOUTH EVERY DAY IN THE MORNING CAPSULE ORAL
Qty: 30 | Refills: 0 | Status: ON HOLD | COMMUNITY
Start: 2019-01-22

## 2022-07-01 RX ORDER — MERCAPTOPURINE 50 MG/1
2 TABLETS TABLET ORAL DAILY
Status: ACTIVE | COMMUNITY

## 2022-07-01 RX ORDER — NYSTATIN AND TRIAMCINOLONE ACETONIDE 100000; 1 MG/G; MG/G
CREAM TOPICAL
Qty: 30 | Refills: 0 | Status: ON HOLD | COMMUNITY
Start: 2013-08-13

## 2022-07-01 RX ORDER — PANTOPRAZOLE SODIUM 40 MG/1
7/29**TAKE 1 TABLET BY MOUTH EVERY DAY 90 TABLET, DELAYED RELEASE ORAL
Qty: 90 TABLET | Refills: 4 | Status: ACTIVE | COMMUNITY

## 2022-07-01 RX ORDER — ACETAMINOPHEN 325 MG
TAKE 1 CAPSULE DAILY TABLET ORAL
Refills: 0 | Status: ON HOLD | COMMUNITY

## 2022-07-01 RX ORDER — PREDNISONE 10 MG/1
TABLET ORAL
Qty: 54 | Refills: 0 | Status: ON HOLD | COMMUNITY
Start: 2013-02-13

## 2022-07-01 RX ORDER — DIPHENOXYLATE HYDROCHLORIDE AND ATROPINE SULFATE 2.5; .025 MG/1; MG/1
TABLET ORAL
Qty: 30 | Refills: 0 | Status: ON HOLD | COMMUNITY
Start: 2013-12-20

## 2022-07-01 RX ORDER — COLESEVELAM HYDROCHLORIDE 625 MG/1
1 TABLET TABLET, FILM COATED ORAL TWICE A DAY
Qty: 60 TABLET | Refills: 11 | OUTPATIENT
Start: 2022-07-01

## 2022-07-01 RX ORDER — PREDNISOLONE ACETATE 10 MG/ML
INSTILL 1 DROP IN THE LEFT EYE EVERY HOUR WHILE AWAKE SUSPENSION/ DROPS OPHTHALMIC
Refills: 0 | Status: ON HOLD | COMMUNITY
Start: 2019-03-12

## 2022-07-01 RX ORDER — CODEINE PHOSPHATE AND GUAIFENESIN 10; 100 MG/5ML; MG/5ML
TAKE 5 TO 10ML BY MOUTH 4 TIMES A DAY AS NEEDED COUGH SOLUTION ORAL
Qty: 120 | Refills: 0 | Status: ON HOLD | COMMUNITY
Start: 2018-01-27

## 2022-07-01 RX ORDER — AMOXICILLIN AND CLAVULANATE POTASSIUM 875; 125 MG/1; MG/1
TAKE 1 TABLET (ORAL) EVERY 12 HOURS FOR 7 DAYS TABLET, FILM COATED ORAL
Qty: 14 | Refills: 0 | Status: ON HOLD | COMMUNITY
Start: 2018-03-10

## 2022-07-01 RX ORDER — DOXYCYCLINE HYCLATE 100 MG/1
TABLET ORAL
Qty: 20 | Refills: 0 | Status: ON HOLD | COMMUNITY
Start: 2019-03-16

## 2022-07-01 RX ORDER — CODEINE POLISTIREX AND CHLORPHENIRAMINE POLISTIREX 14.7; 2.8 MG/5ML; MG/5ML
TAKE 2 TEASPOONS EVERY 12 HOURS FOR 7 DAYS SUSPENSION, EXTENDED RELEASE ORAL
Qty: 140 | Refills: 0 | Status: ON HOLD | COMMUNITY
Start: 2018-03-10

## 2022-07-01 RX ORDER — TRIAMCINOLONE ACETONIDE 1 MG/G
APPLY AND GENTLY MASSAGE INTO AFFECTED AREA(S) TWICE DAILY OINTMENT TOPICAL
Refills: 0 | Status: ON HOLD | COMMUNITY
Start: 2019-05-13

## 2022-07-01 RX ORDER — CITALOPRAM 20 MG/1
TAKE ONE TABLET BY MOUTH DAILY TABLET ORAL
Qty: 30 | Refills: 6 | Status: ON HOLD | COMMUNITY
Start: 2014-08-25

## 2022-07-01 RX ORDER — ACETAMINOPHEN AND CODEINE PHOSPHATE 300; 30 MG/1; MG/1
TABLET ORAL
Qty: 18 | Refills: 0 | Status: ON HOLD | COMMUNITY
Start: 2013-05-20

## 2022-07-01 RX ORDER — PREDNISONE 10 MG/1
TABLET ORAL
Qty: 48 | Refills: 0 | Status: ON HOLD | COMMUNITY
Start: 2018-11-14

## 2022-07-01 RX ORDER — LOFEXIDINE HYDROCHLORIDE 0.2 MG/1
TABLET, FILM COATED ORAL
Qty: 108 | Refills: 0 | Status: ON HOLD | COMMUNITY
Start: 2019-02-27

## 2022-07-01 RX ORDER — COLESTIPOL HYDROCHLORIDE 1 G/1
TAKE 2 TABLETS TWICE DAILY TABLET ORAL
Qty: 120 | Refills: 11 | Status: ON HOLD | COMMUNITY
Start: 2018-06-12

## 2022-07-01 RX ORDER — VITAM B12 100 MCG
TAKE 1 TABLET DAILY AS DIRECTED TAB ORAL
Refills: 0 | Status: ON HOLD | COMMUNITY

## 2022-07-01 RX ORDER — MERCAPTOPURINE 50 MG/1
TAKE 2 TABLETS BY MOUTH EVERY DAY TABLET ORAL
Qty: 180 TABLET | Refills: 5 | Status: ON HOLD | COMMUNITY

## 2022-07-01 RX ORDER — FLUCONAZOLE 150 MG/1
TABLET ORAL
Qty: 1 | Refills: 0 | Status: ON HOLD | COMMUNITY
Start: 2013-08-13

## 2022-07-01 RX ORDER — HYDROCODONE BITARTRATE AND ACETAMINOPHEN 10; 325 MG/1; MG/1
TABLET ORAL
Qty: 60 | Refills: 0 | Status: ON HOLD | COMMUNITY
Start: 2013-12-10

## 2022-07-01 RX ORDER — LIDOCAINE HYDROCHLORIDE 20 MG/ML
SOLUTION ORAL; TOPICAL
Qty: 100 | Refills: 0 | Status: ON HOLD | COMMUNITY
Start: 2019-03-02

## 2022-07-01 RX ORDER — OSELTAMIVIR PHOSPHATE 75 MG/1
TAKE 1 CAPSULE (ORAL) 2 TIMES PER DAY FOR 5 DAYS CAPSULE ORAL
Qty: 10 | Refills: 0 | Status: ON HOLD | COMMUNITY
Start: 2018-01-27

## 2022-07-01 RX ORDER — LEVONORGESTREL AND ETHINYL ESTRADIOL 0.15-0.03
TAKE 1 TABLET BY MOUTH EVERY DAY SKIP PLACEBO PILLS KIT ORAL
Qty: 91 | Refills: 0 | Status: ON HOLD | COMMUNITY
Start: 2017-08-17

## 2022-07-01 RX ORDER — HYOSCYAMINE SULFATE 0.12 MG/1
PLACE 1-2 TABLET EVERY 6 HOURS PRN ABD PAIN TABLET, ORALLY DISINTEGRATING ORAL
Qty: 45 | Refills: 4 | Status: ON HOLD | COMMUNITY
Start: 2018-11-27

## 2022-07-01 RX ORDER — PROMETHAZINE HYDROCHLORIDE 12.5 MG/1
TAKE 1 TO 2 TABLETS BY MOUTH EVERY 6 HOURS AS NEEDED TABLET ORAL
Qty: 40 | Refills: 3 | Status: ON HOLD | COMMUNITY

## 2022-07-01 RX ORDER — BENZONATATE 200 MG/1
TAKE 1 CAPSULE BY MOUTH THREE TIMES A DAY CAPSULE ORAL
Qty: 30 | Refills: 0 | Status: ON HOLD | COMMUNITY
Start: 2018-06-20

## 2022-07-01 RX ORDER — MISOPROSTOL 200 UG/1
TABLET ORAL
Qty: 4 | Refills: 0 | Status: ON HOLD | COMMUNITY
Start: 2018-08-27

## 2022-07-01 RX ORDER — CEPHALEXIN 500 MG/1
CAPSULE ORAL
Qty: 30 | Refills: 0 | Status: ON HOLD | COMMUNITY
Start: 2018-10-18

## 2022-07-01 RX ORDER — CITALOPRAM 40 MG/1
0.5 TABLET TABLET, FILM COATED ORAL ONCE A DAY
Status: ACTIVE | COMMUNITY

## 2022-07-01 RX ORDER — OXYCODONE AND ACETAMINOPHEN 5; 325 MG/1; MG/1
TABLET ORAL
Qty: 30 | Refills: 0 | Status: ON HOLD | COMMUNITY
Start: 2013-02-13

## 2022-07-01 RX ORDER — METRONIDAZOLE 500 MG/1
TABLET ORAL
Qty: 28 | Refills: 0 | Status: ON HOLD | COMMUNITY
Start: 2013-02-13

## 2022-07-01 RX ORDER — AZELASTINE HYDROCHLORIDE 137 UG/1
SPRAY, METERED NASAL
Qty: 30 | Refills: 0 | Status: ON HOLD | COMMUNITY
Start: 2018-12-07

## 2022-07-01 RX ORDER — CLONIDINE HYDROCHLORIDE 0.1 MG/1
TAKE 1 TABLET AT BEDTIME TABLET ORAL
Refills: 0 | Status: ON HOLD | COMMUNITY
Start: 2019-05-22

## 2022-07-01 RX ORDER — AZITHROMYCIN DIHYDRATE 250 MG/1
TAKE 2 TABLETS BY MOUTH ON DAY ONE AND 1 TABLET BY MOUTH ON DAYS 2-5 TABLET, FILM COATED ORAL
Qty: 6 | Refills: 0 | Status: ON HOLD | COMMUNITY
Start: 2018-03-10

## 2022-07-14 PROBLEM — 71833008 CROHN'S DISEASE OF SMALL AND LARGE INTESTINES: Status: ACTIVE | Noted: 2022-07-14

## 2022-09-01 ENCOUNTER — OFFICE VISIT (OUTPATIENT)
Dept: URBAN - METROPOLITAN AREA CLINIC 113 | Facility: CLINIC | Age: 37
End: 2022-09-01

## 2022-12-05 ENCOUNTER — WEB ENCOUNTER (OUTPATIENT)
Dept: URBAN - METROPOLITAN AREA CLINIC 113 | Facility: CLINIC | Age: 37
End: 2022-12-05

## 2022-12-05 RX ORDER — PROMETHAZINE HYDROCHLORIDE 12.5 MG/1
1 TABLET TABLET ORAL
Qty: 45 | Refills: 4 | OUTPATIENT
Start: 2022-12-09 | End: 2023-05-08

## 2022-12-06 ENCOUNTER — OFFICE VISIT (OUTPATIENT)
Dept: URBAN - METROPOLITAN AREA CLINIC 113 | Facility: CLINIC | Age: 37
End: 2022-12-06

## 2025-05-02 ENCOUNTER — LAB REQUISITION (OUTPATIENT)
Dept: LAB | Age: 40
End: 2025-05-02

## 2025-05-02 DIAGNOSIS — K50.812 CROHN'S DISEASE OF BOTH SMALL AND LARGE INTESTINE WITH INTESTINAL OBSTRUCTION  (CMD): ICD-10-CM

## 2025-05-02 PROCEDURE — PSEU10042 COPPER, BLOOD: Performed by: CLINICAL MEDICAL LABORATORY

## 2025-05-02 PROCEDURE — PSEU8904 ZINC: Performed by: CLINICAL MEDICAL LABORATORY

## 2025-05-02 PROCEDURE — 82525 ASSAY OF COPPER: CPT | Performed by: CLINICAL MEDICAL LABORATORY

## 2025-05-02 PROCEDURE — 84630 ASSAY OF ZINC: CPT | Performed by: CLINICAL MEDICAL LABORATORY

## 2025-05-05 LAB
COPPER SERPL-MCNC: 112 MCG/DL (ref 80–155)
ZINC SERPL-MCNC: 62 MCG/DL (ref 70–120)